# Patient Record
Sex: MALE | Race: OTHER | Employment: UNEMPLOYED | URBAN - METROPOLITAN AREA
[De-identification: names, ages, dates, MRNs, and addresses within clinical notes are randomized per-mention and may not be internally consistent; named-entity substitution may affect disease eponyms.]

---

## 2021-03-31 PROBLEM — M54.2 CERVICALGIA: Status: ACTIVE | Noted: 2021-03-31

## 2021-03-31 PROBLEM — K59.00 CONSTIPATION: Status: ACTIVE | Noted: 2021-03-31

## 2021-03-31 PROBLEM — R26.89 ANTALGIC GAIT: Status: ACTIVE | Noted: 2021-03-31

## 2021-03-31 PROBLEM — R20.2 NUMBNESS AND TINGLING OF BOTH UPPER EXTREMITIES: Status: ACTIVE | Noted: 2021-03-31

## 2021-03-31 PROBLEM — R20.0 NUMBNESS AND TINGLING OF BOTH LEGS: Status: ACTIVE | Noted: 2021-03-31

## 2021-03-31 PROBLEM — M54.50 ACUTE BILATERAL LOW BACK PAIN: Status: ACTIVE | Noted: 2021-03-31

## 2021-03-31 PROBLEM — M54.6 ACUTE BILATERAL THORACIC BACK PAIN: Status: ACTIVE | Noted: 2021-03-31

## 2021-03-31 PROBLEM — T14.90XA INJURY: Status: ACTIVE | Noted: 2021-03-31

## 2021-03-31 PROBLEM — R20.0 NUMBNESS AND TINGLING OF BOTH UPPER EXTREMITIES: Status: ACTIVE | Noted: 2021-03-31

## 2021-03-31 PROBLEM — Z13.228 SCREENING FOR METABOLIC DISORDER: Status: ACTIVE | Noted: 2021-03-31

## 2021-03-31 PROBLEM — R30.0 DYSURIA: Status: ACTIVE | Noted: 2021-03-31

## 2021-03-31 PROBLEM — R20.2 NUMBNESS AND TINGLING OF BOTH LEGS: Status: ACTIVE | Noted: 2021-03-31

## 2021-04-02 ENCOUNTER — HOSPITAL ENCOUNTER (OUTPATIENT)
Dept: MRI IMAGING | Age: 46
Discharge: HOME | End: 2021-04-02
Attending: FAMILY MEDICINE

## 2021-04-02 DIAGNOSIS — T14.90XA INJURY: ICD-10-CM

## 2021-04-02 DIAGNOSIS — R20.0 NUMBNESS AND TINGLING OF BOTH LEGS: ICD-10-CM

## 2021-04-02 DIAGNOSIS — R20.0 NUMBNESS AND TINGLING OF BOTH UPPER EXTREMITIES: ICD-10-CM

## 2021-04-02 DIAGNOSIS — R20.2 NUMBNESS AND TINGLING OF BOTH UPPER EXTREMITIES: ICD-10-CM

## 2021-04-02 DIAGNOSIS — M54.50 ACUTE BILATERAL LOW BACK PAIN, UNSPECIFIED WHETHER SCIATICA PRESENT: ICD-10-CM

## 2021-04-02 DIAGNOSIS — R20.2 NUMBNESS AND TINGLING OF BOTH LEGS: ICD-10-CM

## 2021-04-02 DIAGNOSIS — M54.2 CERVICALGIA: ICD-10-CM

## 2021-04-02 DIAGNOSIS — M54.6 ACUTE BILATERAL THORACIC BACK PAIN: ICD-10-CM

## 2021-04-02 PROCEDURE — 72141 MRI NECK SPINE W/O DYE: CPT

## 2021-04-02 PROCEDURE — 72148 MRI LUMBAR SPINE W/O DYE: CPT

## 2021-04-02 PROCEDURE — 72146 MRI CHEST SPINE W/O DYE: CPT

## 2021-04-30 PROBLEM — Z13.228 SCREENING FOR METABOLIC DISORDER: Status: RESOLVED | Noted: 2021-03-31 | Resolved: 2021-04-30

## 2021-05-14 ENCOUNTER — APPOINTMENT (OUTPATIENT)
Dept: PHYSICAL THERAPY | Age: 46
End: 2021-05-14

## 2021-05-17 ENCOUNTER — APPOINTMENT (OUTPATIENT)
Dept: PHYSICAL THERAPY | Age: 46
End: 2021-05-17

## 2021-05-20 ENCOUNTER — APPOINTMENT (OUTPATIENT)
Dept: PHYSICAL THERAPY | Age: 46
End: 2021-05-20

## 2021-05-21 ENCOUNTER — APPOINTMENT (OUTPATIENT)
Dept: PHYSICAL THERAPY | Age: 46
End: 2021-05-21

## 2021-05-24 ENCOUNTER — APPOINTMENT (OUTPATIENT)
Dept: PHYSICAL THERAPY | Age: 46
End: 2021-05-24

## 2021-05-26 ENCOUNTER — APPOINTMENT (OUTPATIENT)
Dept: PHYSICAL THERAPY | Age: 46
End: 2021-05-26

## 2021-05-28 ENCOUNTER — APPOINTMENT (OUTPATIENT)
Dept: PHYSICAL THERAPY | Age: 46
End: 2021-05-28

## 2021-06-04 ENCOUNTER — HOSPITAL ENCOUNTER (OUTPATIENT)
Dept: PHYSICAL THERAPY | Age: 46
Discharge: HOME | End: 2021-06-04
Attending: ORTHOPAEDIC SURGERY

## 2021-06-04 DIAGNOSIS — M79.604 RIGHT LEG PAIN: ICD-10-CM

## 2021-06-04 DIAGNOSIS — M54.6 THORACIC BACK PAIN, UNSPECIFIED BACK PAIN LATERALITY, UNSPECIFIED CHRONICITY: ICD-10-CM

## 2021-06-04 DIAGNOSIS — M54.2 NECK PAIN: ICD-10-CM

## 2021-06-04 DIAGNOSIS — M54.50 LOW BACK PAIN, UNSPECIFIED BACK PAIN LATERALITY, UNSPECIFIED CHRONICITY, UNSPECIFIED WHETHER SCIATICA PRESENT: ICD-10-CM

## 2021-06-04 DIAGNOSIS — M79.605 LEFT LEG PAIN: ICD-10-CM

## 2021-06-04 PROCEDURE — 97162 PT EVAL MOD COMPLEX 30 MIN: CPT

## 2021-06-04 NOTE — PROGRESS NOTES
Himanshu Romo  : 1975  Payor: /  [id number]  at [facility]  [address], [address] [facility], [address]  [phone]   [phone]      OUTPATIENT PHYSICAL THERAPY: Daily Treatment Note 2021  Visit Count:  1    ICD-10: Treatment Diagnosis:   M54.5 Low Back Pain   M54.6 Pain in thoracic spine  M54.2 Cervicalgia  R26.2 Difficulty in Walking, Not Elsewhere Classified    Precautions/Allergies:   Patient has no known allergies. TREATMENT PLAN:  Effective Dates: 2021 TO 2021 (90 days). Frequency/Duration: 2 times a week for 90 Days        PRE-TREATMENT SYMPTOMS/COMPLAINTS:  Central LBP    MEDICATIONS REVIEWED:  2021   TREATMENT:   (In addition to Assessment/Re-Assessment sessions the following treatments were rendered)    THERAPEUTIC EXERCISE: (0 minutes):  Exercises per grid below to improve mobility, strength and balance. Required minimal visual and verbal cues to promote proper body alignment and promote proper body posture. Progressed resistance, range and complexity of movement as indicated. Date:  2021 Date:   Date:     Activity/Exercise Parameters Parameters Parameters   LTR HEP     Seated HS stretch HEP                                     MANUAL THERAPY: (0 minutes): Joint mobilization, Soft tissue mobilization and Manipulation was utilized and necessary because of the patient's restricted joint motion, painful spasm, loss of articular motion and restricted motion of soft tissue. (Used abbreviations: MET - muscle energy technique; PNF - proprioceptive neuromuscular facilitation; NMR - neuromuscular re-education; AP - anterior to posterior; PA - posterior to anterior)    MODALITIES: (0 minutes):      none      TREATMENT/SESSION ASSESSMENT:  Himanshu Romo verbalized understanding of role of PT and POC.     Education: on objective findings    RECOMMENDATIONS/INTENT FOR NEXT TREATMENT SESSION: \"Next visit will focus on advancements to more challenging activities\".     PAIN: Initial: 7/10 Post Session:  7/10     [employer] Portal    Total Treatment Billable Duration:  PT Patient Time In/Time Out  Time In: 1305  Time Out: Faisal 61, PT, DPT    Future Appointments   Date Time Provider Marianna Aminah   6/15/2021 10:40 AM Anitha Roblero MD [id number] [place]       Visit Approval Visit # Therapist initials Date A NS / Cx < 24 hr >24 hr Cx Comments    1 PAU 6/4/21 [x]  [] [] Initial evaluation (self pay eval only)       [] [] []        [] [] []        [] [] []        [] [] []        [] [] []        [] [] []        [] [] []        [] [] []        [] [] []        [] [] []        [] [] []        [] [] []        [] [] []        [] [] []        [] [] []        [] [] []        [] [] []

## 2021-06-04 NOTE — THERAPY EVALUATION
Robert Chavez : 1975 Payor:   [address]  at [facility] [address], [address] [facility], [address] [phone]   [phone] OUTPATIENT PHYSICAL THERAPY:Initial Assessment 2021 ICD-10: Treatment Diagnosis:  
M54.5 Low Back Pain M54.6 Pain in thoracic spine M54.2 Cervicalgia R26.2 Difficulty in Walking, Not Elsewhere Classified Precautions/Allergies:  
Patient has no known allergies. Fall Risk Score: (? 5 = High Risk) MD Orders: Eval and Treat MEDICAL/REFERRING DIAGNOSIS: 
Thoracic back pain, unspecified back pain laterality, unspecified chronicity [M54.6] Low back pain, unspecified back pain laterality, unspecified chronicity, unspecified whether sciatica present [M54.5] Neck pain [M54.2] Left leg pain [M79.605] Right leg pain [M79.604] DATE OF ONSET: 2021 REFERRING PHYSICIAN: Letitia Bañuelos MD 
RETURN PHYSICIAN APPOINTMENT: TBD by pt ASSESSMENT:  Mr. Ilir Treviño has attended 1 physical therapy session including the initial evaluation. Pt presents with decreased ROM, decreased strength, and increased pain affecting participation in ADLs, functional mobility, and work tasks. Suspect possible malingering due to inconsistencies throughout examination. Recommending skilled PT: manual therapeutic techniques (as appropriate), therapeutic exercises and activities, balance interventions, and a comprehensive home exercise program to address the current impairments, as listed above. Robert Chavez will benefit from skilled PT (medically necessary) to address above deficits affecting participation in basic ADLs and overall functional tolerance. PROBLEM LIST (Impacting functional limitations): 1. Decreased Strength 2. Decreased ADL/Functional Activities 3. Decreased Transfer Abilities 4. Decreased Ambulation Ability/Technique 5. Decreased Balance 6. Increased Pain 7. Decreased Flexibility/Joint Mobility 8.  Decreased [place] with Home Exercise Program INTERVENTIONS PLANNED: 
1. Balance Exercise 2. Cold 3. Cryotherapy 4. Electrical Stimulation 5. Family Education 6. Gait Training 7. Heat 8. Home Exercise Program (HEP) 9. Manual Therapy 10. Neuromuscular Re-education/Strengthening 11. Range of Motion (ROM) 12. Therapeutic Activites 13. Therapeutic Exercise/Strengthening 14. Transcutaneous Electrical Nerve Stimulation (TENS) 15. Transfer Training 16. Vasopneumatic Device 17. Dry Needling TREATMENT PLAN: 
TREATMENT PLAN: 
Effective Dates: 6/4/2021 TO 9/2/2021 (90 days). Frequency/Duration: 2 times a week for 90 Days Short-Term Functional Goals: Time Frame: 4 weeks 1. Pt will be compliant with HEP. 2. Pt will decreased worst pain to 6/10 or less in order to return to lifting for work tasks 3. Pt will decreased DEREK to 30 or less demonstrating overall improvements in functional mobility 4. Pt will improve BLE strength to 4/5 in order to improve standing and walking tolerance to > 20 minutes Discharge Goals: Time Frame: 12 weeks 1. Pt will be independent with HEP. 2. Pt will decreased worst pain to 4/10 or less in order to resume hygiene activities 3. Pt will decreased DEREK to 20 or less demonstrating overall improvements in functional mobility 4. Pt will improve BLE strength to 4+/5 or greater in order to improve standing and walking tolerance to > 30 minutes Rehabilitation Potential For Stated Goals: Guarded Outcome Measure: Tool Used: Lower Extremity Functional Scale (LEFS) Score:  Initial: 24/80 Most Recent: X/80 (Date: -- ) Interpretation of Score: 20 questions each scored on a 5 point scale with 0 representing \"extreme difficulty or unable to perform\" and 4 representing \"no difficulty\". The lower the score, the greater the functional disability. 80/80 represents no disability. Minimal detectable change is 9 points.  
 
 
Tool Used: Modified Oswestry Low Back Pain Questionnaire Score:  Initial: 35/50  Most Recent: X/50 (Date: -- ) Interpretation of Score: Each section is scored on a 0-5 scale, 5 representing the greatest disability. The scores of each section are added together for a total score of 50. Medical Necessity:  
· Skilled intervention continues to be required due to decreased strength, ROM, balance, and functional mobility. Reason for Services/Other Comments: 
· Patient continues to require skilled intervention due to decreased strength, balance, and ROM with increased pain affecting pt functional mobility. ·  
Regarding Justin Romero therapy, I certify that the treatment plan above will be carried out by a therapist or under their direction. Thank you for this referral, Royce Velazquez, PT, DPT Referring Physician Signature: Riana Gómez MD              Date The information in this section was collected on 6/4/21 (except where otherwise noted). HISTORY:  
History of Present Injury/Illness (Reason for Referral): 
Pt arrives with c/o of centralized lower back pain. Pt reports DEEP occurred March 1, 2021. Pt reports lifting a panel with his supervisor over his head and resting it on his head. Pt reports hyperextending at his back. Pt reporting increased difficulty with walking and lifting heavier objects. Pt reports his R LE feels \"sleeping\" at times then stating L one falls asleep as well. Pt reports increased pain with standing and having difficulty sleeping throughout the night. Pt currently out of work. Per referring physician note HPI this is a new visit on Britney Tejada is a 58-year-old  gentleman who is referred by Dr. Karrie Tripp for complaints of cervical thoracic and lumbar pain.   He states he has been hurting for 4 to 6 weeks since a work injury as he was trying to hold up a 20 to 30 pound basket while in a squatting position and he said initially he had pain in the upper back neck but over time its been more prominent lower back and bilateral legs he describes the leg pain is going down the posterior legs to the feet with occasional numbness and tingling in the right and left leg pain about equal but the worst overall pain he has is in the thoracic area and he has not worked since the injury and when I ask about attorneys he currently does not have 1 but his friend had us work injury and told him he needs to get an  so he is good to be looking into that. He says the pain is constant is worse walking is better laying on his side and he is having to use a cane to get around now but when he goes to his medical history he denies any diabetes heart attack or heart or lung problem any major medical problem is not on blood thinners and treatment has been anti-inflammatories with both meloxicam and Naprosyn as well as tramadol and activity restriction. Cervical thoracic lumbar MRI scan dated 4/2/2021. His cervical and thoracic MRIs were totally normal to me and the radiologist basically said totally normal.  His lumbar MRI is relatively unimpressive he does have both some bulging of the disc at L4-5 a little offset to the right is not really a disc herniation but there is some bulging and potentially could cause lateral recess stenosis and some pressure on the right L5 nerve root but that is not his symptoms. Overall patient's exam see to be inconsistent with what we see on the scans he seems to have a lot of pain behavior a lot of pain but not any significant findings I wonder if there is secondary gain issues here.   I recommend that we put him in therapy his he has not had that this would appear to be a strain sprain type issue of the soft tissues put him in therapy give him a Sterapred DS 12-day Dosepak refill meloxicam 7.5 mg prescription 60 tablets 1 twice daily and give him a refill of the tramadol 28 tablets 1 every 6 hours as needed with a refill and see if he gets better with the the therapy and anti-inflammatories and steroids if not we will just have him follow-up with his family doctor again I could not find an area I thought in his legs that would be worth getting an x-ray on since everything is tender and hurts maybe things will be more defined after he finishes therapy this concludes note CC/Primary Concern: Bilateral back pain Treatment Side: Bilateral 
 
 
Past Medical History/Comorbidities:  
Mr. Ирина Calvillo  has no past medical history on file. Mr. Ирина Calvillo  has no past surgical history on file. Pain/Symptom Location: Centralized lower back pain Worst Pain: 8/10 Current Pain: 7/10 Aggravating Factors: Standing and Walking Alleviating Factors/Positions/Motions: Rest, medication Diagnostic Imaging: MRI results as seen below CERVICAL FINDINGS:  Signal from the marrow spaces in the spine is unremarkable. The cerebellar tonsils lie at the level of the foramen magnum. There is 
straightening of the cervical spine with loss of normal lordosis. 
  
At C4-5, C5-6, and C6-7, there are mild posterior disc bulges without 
significant central or foraminal narrowing. 
  
  
THORACIC FINDINGS:  Signal from the thoracic cord and marrow spaces in the 
thoracic spine is unremarkable. 
  
No focal disc protrusion is evident.   There is very mild central and bilateral 
foraminal narrowing at T9-T10 associated with prominent facet spurring. 
  
  
LUMBAR FINDINGS: Signal from the marrow spaces and lumbar spine is unremarkable. 
  
At L1-2, there is broad-based posterior disc protrusion/spur complex without 
significant central or foraminal narrowing. 
  
At L2-3, there is mild central spinal stenosis associated with broad-based 
posterior disc protrusion and a very small central inferior extrusion as well as 
bilateral facet hypertrophy. 
  
At L3-4, there is broad-based posterior disc protrusion with mild bilateral 
foraminal narrowing. 
  
At L4-5, mild central spinal stenosis is associated with broad-based posterior 
disc protrusion which is most marked at the right paracentral/foraminal margin. There is Moderate right and mild left foraminal narrowing as well. 
  
At L5-S1, there is mild diffuse posterior disc bulge as well as moderate right 
and mild left foraminal narrowing. Occupation: Pt works in construction Prior Level of Function/Work/Activity:  
Pt independent with all mobility and painfree but then later reports having some pain at neck and B shoulder prior to injury but then states this is only since injury. Patient Goals: To get rid of the back pain Current Medications:   
  
Current Outpatient Medications:  
  meloxicam (MOBIC) 7.5 mg tablet, Take 1 Tab by mouth two (2) times a day., Disp: 60 Tab, Rfl: 2 
  naproxen (NAPROSYN) 500 mg tablet, Take 1 Tab by mouth two (2) times daily (with meals). For pain, Disp: 20 Tab, Rfl: 0 
  losartan (COZAAR) 25 mg tablet, Take 1 Tab by mouth daily. , Disp: 90 Tab, Rfl: 0 Date Last Reviewed:  6/4/2021 Ambulatory/Rehab Services H2 Model Falls Risk Assessment Risk Factors: 
     (1)  Gender [Male] Ability to Rise from Chair: 
     (0)  Ability to rise in a single movement Falls Prevention Plan: No modifications necessary Total: (5 or greater = High Risk): 1 ©2010 [facility] States Patent #[id number]. Federal Law prohibits the replication, distribution or use without written permission from [facility] of [employer] Number of Personal Factors/Comorbidities that affect the Plan of Care: 1-2: MODERATE COMPLEXITY EXAMINATION:  
Inspection Pelvic alignment: Not assessed this session Additional Comments:  
________________________________________________________________________________________________ Observation Observed pt ambulating into clinic from parking lot without use of SPC and normal gait pattern, lauro and equal arm swing.  Pt then returned to vehicle and back to clinic with use of SPC and antalgic gait pattern and holding R UE to lower back continued this gait pattern into treatment room for evaluation Posture: LE Structure: 
      Right: slight ER Left: unremarkable Gait: Antalgic and Decreased Step Length Assistive Device: [facility] Edema: None noted Addition Comments:  
 
________________________________________________________________________________________________ Range of Motion Lumbar Joint:    
 Right (Degrees/Percent) Left (Degrees/Percent) Flexion 15 degrees, pain Extension 5 degrees, pain Side Bending 10 degrees, pain 10 degrees, pain Rotation Lower Joint: Passive Passive Active Active Right (Degrees) Left (Degrees) Right (Degrees) Left (Degrees) Hip Flexion   Decreased Decreased Hip Extension Hip Abduction Hip Internal Rotation    Decreased Decreased Hip External Rotation   Decreased Decreased Additional Comments:  
________________________________________________________________________________________________ Strength Lower Extremity Joint:    
 RIGHT LEFT Hip Flexion 3+/5 4+-5/5 Hip Extension NT/5 NT/5 Hip Internal Rotation 3/5 (stops resisting) 4+/5 Hip External Rotation 3/5 (stops resisting) 3/5 Hip Abduction NT/5 NT/5 Knee flexion 3/5 (stops resisting) 5/5 Knee extension 3/5 (stops resisting) 5/5   
 
________________________________________________________________________________________________ Neruo-Vascular C/O Radicular Symptoms: Yes Additional Comments: Describes as numbness (sleeping) 
________________________________________________________________________________________________ 
       
________________________________________________________________________________________________ Palpation: B lumbar paraspinals (pt reports \"itching\" sensation) pt sensitive to both deep pressure and light touch (eliciting very exacceragted pain response with both) Joint mobilization: Not assessed this session due to pain level Body Structures Involved: 1. Nerves 2. Bones 3. Joints 4. Muscles 5. Ligaments Body Functions Affected: 1. Sensory/Pain 2. Neuromusculoskeletal 
3. Movement Related Activities and Participation Affected: 1. General Tasks and Demands 2. Mobility 3. Self Care 4. Domestic Life 5. Interpersonal Interactions and Relationships 6. Community, Social and [place] Number of elements (examined above) that affect the Plan of Care: 4+: HIGH COMPLEXITY CLINICAL PRESENTATION:  
Presentation: Evolving clinical presentation with unstable and unpredictable characteristics: HIGH COMPLEXITY CLINICAL DECISION MAKING:  
  
Use of outcome tool(s) and clinical judgement create a POC that gives a: Questionable prediction of patient's progress: MODERATE COMPLEXITY

## 2021-06-11 ENCOUNTER — HOSPITAL ENCOUNTER (OUTPATIENT)
Dept: PHYSICAL THERAPY | Age: 46
Discharge: HOME | End: 2021-06-11
Attending: ORTHOPAEDIC SURGERY

## 2021-06-11 PROCEDURE — 97110 THERAPEUTIC EXERCISES: CPT

## 2021-06-11 PROCEDURE — 97140 MANUAL THERAPY 1/> REGIONS: CPT

## 2021-06-11 NOTE — PROGRESS NOTES
Rosalba Giordano  : 1975  Payor: /  [id number]  at [facility]  [address], [address], [place], [address] [facility]  [phone]   [phone]      OUTPATIENT PHYSICAL THERAPY: Daily Treatment Note 2021  Visit Count:  2    ICD-10: Treatment Diagnosis:   M54.5 Low Back Pain   M54.6 Pain in thoracic spine  M54.2 Cervicalgia  R26.2 Difficulty in Walking, Not Elsewhere Classified    Precautions/Allergies:   Patient has no known allergies. TREATMENT PLAN:  Effective Dates: 2021 TO 2021 (90 days). Frequency/Duration: 2 times a week for 90 Days        PRE-TREATMENT SYMPTOMS/COMPLAINTS:  Pt arrives today without SPC today. Pt reports back pain is the same today and hasn't changed. Pt reports he has tried ice but didn't find any relief. MEDICATIONS REVIEWED:  2021   TREATMENT:   (In addition to Assessment/Re-Assessment sessions the following treatments were rendered)    THERAPEUTIC EXERCISE: (15 minutes):  Exercises per grid below to improve mobility, strength and balance. Required minimal visual and verbal cues to promote proper body alignment and promote proper body posture. Progressed resistance, range and complexity of movement as indicated.      Date:  2021 Date:  21 Date:     Activity/Exercise Parameters Parameters Parameters   LTR HEP 10 X each    Bridges  10 X    ASLR  10 X each    clamshell  10 X each    S/l hip abduction  10 X each    STS   5 X slow eccentric movement      MANUAL THERAPY: (25 minutes): Joint mobilization, Soft tissue mobilization and Manipulation was utilized and necessary because of the patient's restricted joint motion, painful spasm, loss of articular motion and restricted motion of soft tissue.   + manual stretching B HS, glutes, piriformis   + STM B lumbar paraspinals    (Used abbreviations: MET - muscle energy technique; PNF - proprioceptive neuromuscular facilitation; NMR - neuromuscular re-education; AP - anterior to posterior; PA - posterior to anterior)    MODALITIES: (0 minutes):      none      TREATMENT/SESSION ASSESSMENT:  Again arrives into clinic without antalgic gait and waiting room however when taken from waiting room support R side of LB with R hand. Continues with very exaggerated response inconsistently to light touch and deep pressure. Pt continues to demonstrate inconsistencies (ie. Lifts LE onto mat without difficulty but later presents with inability to bend and knee and hip, able to bend knee for clamshells without difficulty) Pt demonstrating poor eccentric control during SLR at beginning of repetitions but good control towards end of repetitions. Pt able to perform STS with very slow controlled movement when PT requests. Observed pt ambulating out of clinic with support to R side of back and then waving to another car with R hand and walked remaining way to car without support to lower back region and normal gait pattern. Education: on objective findings    RECOMMENDATIONS/INTENT FOR NEXT TREATMENT SESSION: \"Next visit will focus on advancements to more challenging activities\".     PAIN: Initial: 8/10 Post Session: 9 /10     [employer] Portal    Total Treatment Billable Duration:  PT Patient Time In/Time Out  Time In: 1015  Time Out: 1055  Helga Esparza, PT, DPT    Future Appointments   Date Time Provider Marianna Burr   6/14/2021  8:45 AM Polly OROURKE [facility]   6/15/2021 10:40 AM Roverto Sykes MD [id number] [place]   6/16/2021  8:45 AM Polly OROURKE [facility]       Visit Approval Visit # Therapist initials Date A NS / Cx < 24 hr >24 hr Cx Comments    1 PAU 6/4/21 [x]  [] [] Initial evaluation (self pay eval only)     PAU 6/9/21 [] [x] [] Called and left voicemail regarding today's missed appointment    2 [facility] 6/11/21 [x] [] []        [] [] []        [] [] []        [] [] []        [] [] []        [] [] []        [] [] []        [] [] []        [] [] []        [] [] []        [] [] []        [] [] []        [] [] []        [] [] []        [] [] []        [] [] []

## 2021-06-16 ENCOUNTER — APPOINTMENT (OUTPATIENT)
Dept: PHYSICAL THERAPY | Age: 46
End: 2021-06-16
Attending: ORTHOPAEDIC SURGERY

## 2021-06-25 ENCOUNTER — HOSPITAL ENCOUNTER (OUTPATIENT)
Dept: PHYSICAL THERAPY | Age: 46
Discharge: HOME | End: 2021-06-25
Attending: ORTHOPAEDIC SURGERY

## 2021-06-25 PROCEDURE — 97110 THERAPEUTIC EXERCISES: CPT

## 2021-06-25 PROCEDURE — 97140 MANUAL THERAPY 1/> REGIONS: CPT

## 2021-06-25 NOTE — PROGRESS NOTES
Michael Fisher  : 1975  Payor: /  [id number]  at [facility]  [address], [address], [place], [facility]  [phone]   [phone]        OUTPATIENT PHYSICAL THERAPY: Daily Treatment Note 2021  Visit Count:  3    ICD-10: Treatment Diagnosis:   M54.5 Low Back Pain   M54.6 Pain in thoracic spine  M54.2 Cervicalgia  R26.2 Difficulty in Walking, Not Elsewhere Classified    Precautions/Allergies:   Patient has no known allergies. TREATMENT PLAN:  Effective Dates: 2021 TO 2021 (90 days). Frequency/Duration: 2 times a week for 90 Days        PRE-TREATMENT SYMPTOMS/COMPLAINTS: Pt reports his back has been feeling about the same. \"I'm not able to jump or run\". Pt reports increased pain at center of back when driving to PT today. MEDICATIONS REVIEWED:  2021   TREATMENT:   (In addition to Assessment/Re-Assessment sessions the following treatments were rendered)    THERAPEUTIC EXERCISE: (30 minutes):  Exercises per grid below to improve mobility, strength and balance. Required minimal visual and verbal cues to promote proper body alignment and promote proper body posture. Progressed resistance, range and complexity of movement as indicated.      Date:  2021 Date:  21 Date:  21   Activity/Exercise Parameters Parameters Parameters   LTR HEP 10 X each 10 X each   Bridges  10 X 10 X    ASLR  10 X each 10 X each   clamshell  10 X each 10 X each   S/l hip abduction  10 X each 10 X each   STS   5 X slow eccentric movement 5X w/ slow eccentric movement     MANUAL THERAPY: (10 minutes): Joint mobilization, Soft tissue mobilization and Manipulation was utilized and necessary because of the patient's restricted joint motion, painful spasm, loss of articular motion and restricted motion of soft tissue.   + manual stretching B HS, glutes, piriformis   +STM B lumbar paraspinals      (Used abbreviations: MET - muscle energy technique; PNF - proprioceptive neuromuscular facilitation; NMR - neuromuscular re-education; AP - anterior to posterior; PA - posterior to anterior)    MODALITIES: (0 minutes):      none      TREATMENT/SESSION ASSESSMENT: Again pt noted walking into clinic with normalized gait pattern and then began holding to R side of lower back when in the office. Improved mobility noted with supine<>sitting and rolling on mat with transitioning to perform exercises. Pt performs exercises today with good slow controlled movement and additional time but with increased respiratory rate and grunting. Pt again able to perform STS with very slow controlled movement again with grunting during each rep. Education: on objective findings    RECOMMENDATIONS/INTENT FOR NEXT TREATMENT SESSION: \"Next visit will focus on advancements to more challenging activities\". PAIN: Initial: 7/10 Post Session:  8/10     [employer] Portal    Total Treatment Billable Duration:  PT Patient Time In/Time Out  Time In: 0805  Time Out: 0845  Traci Grigsby, PT, DPT    Future Appointments   Date Time Provider Marianna Burr   6/28/2021  9:00 AM Mine Holman MD [id number]   7/2/2021  8:00 AM Kenia OROURKE [facility]       Visit Approval Visit # Therapist initials Date A NS / Cx < 24 hr >24 hr Cx Comments    1 PAU 6/4/21 [x]  [] [] Initial evaluation (self pay eval only)     PAU 6/9/21 [] [x] [] Called and left voicemail regarding today's missed appointment    2 PAU 6/11/21 [x] [] []      PAU 6/14/21 [] [x] [] Called and left voicemail regarding today's missed appointment. Reminded pt of discharge if NS/same day cancel for any remaining June appointments per clinic policy.     3 PAU 6/25/21 [x] [] []        [] [] []        [] [] []        [] [] []        [] [] []        [] [] []        [] [] []        [] [] []        [] [] []        [] [] []        [] [] []        [] [] []        [] [] []        [] [] []

## 2021-06-28 PROBLEM — M54.6 THORACIC BACK PAIN: Status: ACTIVE | Noted: 2021-06-28

## 2021-06-28 PROBLEM — I10 ESSENTIAL HYPERTENSION: Status: ACTIVE | Noted: 2021-06-28

## 2021-06-28 PROBLEM — M54.50 LOW BACK PAIN: Status: ACTIVE | Noted: 2021-06-28

## 2021-06-28 PROBLEM — M53.9 MULTILEVEL DEGENERATIVE DISC DISEASE: Status: ACTIVE | Noted: 2021-06-28

## 2021-08-30 PROBLEM — M47.816 FACET ARTHROPATHY, LUMBAR: Status: ACTIVE | Noted: 2021-08-30

## 2021-12-07 PROBLEM — R05.9 COUGH: Status: ACTIVE | Noted: 2021-12-07

## 2021-12-07 PROBLEM — R10.826 EPIGASTRIC ABDOMINAL TENDERNESS WITH REBOUND TENDERNESS: Status: ACTIVE | Noted: 2021-12-07

## 2021-12-07 PROBLEM — G89.4 CHRONIC PAIN SYNDROME: Status: ACTIVE | Noted: 2021-12-07

## 2021-12-07 PROBLEM — J32.9 SINUSITIS: Status: ACTIVE | Noted: 2021-12-07

## 2021-12-07 PROBLEM — R10.10 UPPER ABDOMINAL PAIN: Status: ACTIVE | Noted: 2021-12-07

## 2021-12-07 PROBLEM — Z23 ENCOUNTER FOR IMMUNIZATION: Status: ACTIVE | Noted: 2021-12-07

## 2021-12-07 PROBLEM — R07.9 CHEST PAIN: Status: ACTIVE | Noted: 2021-12-07

## 2021-12-08 ENCOUNTER — HOSPITAL ENCOUNTER (OUTPATIENT)
Dept: CT IMAGING | Age: 46
Discharge: HOME | End: 2021-12-08
Attending: FAMILY MEDICINE

## 2021-12-08 DIAGNOSIS — R10.826 EPIGASTRIC ABDOMINAL TENDERNESS WITH REBOUND TENDERNESS: ICD-10-CM

## 2021-12-08 DIAGNOSIS — R10.10 UPPER ABDOMINAL PAIN: ICD-10-CM

## 2021-12-08 DIAGNOSIS — R07.9 CHEST PAIN, UNSPECIFIED TYPE: ICD-10-CM

## 2021-12-08 PROCEDURE — 74011000636 HC RX REV CODE- 636: Performed by: FAMILY MEDICINE

## 2021-12-08 PROCEDURE — 74011000258 HC RX REV CODE- 258: Performed by: FAMILY MEDICINE

## 2021-12-08 PROCEDURE — 74177 CT ABD & PELVIS W/CONTRAST: CPT

## 2021-12-08 RX ORDER — SODIUM CHLORIDE 0.9 % (FLUSH) 0.9 %
10 SYRINGE (ML) INJECTION
Status: ACTIVE | OUTPATIENT
Start: 2021-12-08 | End: 2021-12-08

## 2021-12-08 RX ADMIN — IOPAMIDOL 100 ML: 755 INJECTION, SOLUTION INTRAVENOUS at 12:30

## 2021-12-08 RX ADMIN — SODIUM CHLORIDE 100 ML: 9 INJECTION, SOLUTION INTRAVENOUS at 12:29

## 2021-12-08 RX ADMIN — DIATRIZOATE MEGLUMINE AND DIATRIZOATE SODIUM 15 ML: 660; 100 LIQUID ORAL; RECTAL at 12:30

## 2022-01-04 PROBLEM — L27.0 DRUG RASH: Status: ACTIVE | Noted: 2022-01-04

## 2022-01-06 PROBLEM — Z23 ENCOUNTER FOR IMMUNIZATION: Status: RESOLVED | Noted: 2021-12-07 | Resolved: 2022-01-06

## 2022-01-12 PROBLEM — E66.9 OBESITY (BMI 30-39.9): Status: ACTIVE | Noted: 2022-01-12

## 2022-01-12 PROBLEM — R07.89 ATYPICAL CHEST PAIN: Status: ACTIVE | Noted: 2021-12-07

## 2022-02-11 ENCOUNTER — HOSPITAL ENCOUNTER (OUTPATIENT)
Dept: GENERAL RADIOLOGY | Age: 47
Discharge: HOME | End: 2022-02-11

## 2022-02-11 ENCOUNTER — TRANSCRIBE ORDER (OUTPATIENT)
Dept: REGISTRATION | Age: 47
End: 2022-02-11

## 2022-02-11 DIAGNOSIS — M25.551 RIGHT HIP PAIN: ICD-10-CM

## 2022-02-11 DIAGNOSIS — M25.551 RIGHT HIP PAIN: Primary | ICD-10-CM

## 2022-02-11 PROCEDURE — 73502 X-RAY EXAM HIP UNI 2-3 VIEWS: CPT

## 2022-03-18 PROBLEM — K59.00 CONSTIPATION: Status: ACTIVE | Noted: 2021-03-31

## 2022-03-18 PROBLEM — G89.4 CHRONIC PAIN SYNDROME: Status: ACTIVE | Noted: 2021-12-07

## 2022-03-18 PROBLEM — R20.0 NUMBNESS AND TINGLING OF BOTH UPPER EXTREMITIES: Status: ACTIVE | Noted: 2021-03-31

## 2022-03-18 PROBLEM — R07.89 ATYPICAL CHEST PAIN: Status: ACTIVE | Noted: 2021-12-07

## 2022-03-18 PROBLEM — R20.2 NUMBNESS AND TINGLING OF BOTH UPPER EXTREMITIES: Status: ACTIVE | Noted: 2021-03-31

## 2022-03-18 PROBLEM — M54.50 LOW BACK PAIN: Status: ACTIVE | Noted: 2021-06-28

## 2022-03-18 PROBLEM — J32.9 SINUSITIS: Status: ACTIVE | Noted: 2021-12-07

## 2022-03-18 PROBLEM — R10.826 EPIGASTRIC ABDOMINAL TENDERNESS WITH REBOUND TENDERNESS: Status: ACTIVE | Noted: 2021-12-07

## 2022-03-18 PROBLEM — L27.0 DRUG RASH: Status: ACTIVE | Noted: 2022-01-04

## 2022-03-18 PROBLEM — R10.10 UPPER ABDOMINAL PAIN: Status: ACTIVE | Noted: 2021-12-07

## 2022-03-19 PROBLEM — I10 HYPERTENSION: Status: ACTIVE | Noted: 2021-06-28

## 2022-03-19 PROBLEM — R20.2 NUMBNESS AND TINGLING OF BOTH LEGS: Status: ACTIVE | Noted: 2021-03-31

## 2022-03-19 PROBLEM — M54.2 CERVICALGIA: Status: ACTIVE | Noted: 2021-03-31

## 2022-03-19 PROBLEM — M54.6 THORACIC BACK PAIN: Status: ACTIVE | Noted: 2021-06-28

## 2022-03-19 PROBLEM — M54.50 ACUTE BILATERAL LOW BACK PAIN: Status: ACTIVE | Noted: 2021-03-31

## 2022-03-19 PROBLEM — R30.0 DYSURIA: Status: ACTIVE | Noted: 2021-03-31

## 2022-03-19 PROBLEM — R26.89 ANTALGIC GAIT: Status: ACTIVE | Noted: 2021-03-31

## 2022-03-19 PROBLEM — M47.816 FACET ARTHROPATHY, LUMBAR: Status: ACTIVE | Noted: 2021-08-30

## 2022-03-19 PROBLEM — E66.9 OBESITY (BMI 30-39.9): Status: ACTIVE | Noted: 2022-01-12

## 2022-03-19 PROBLEM — R20.0 NUMBNESS AND TINGLING OF BOTH LEGS: Status: ACTIVE | Noted: 2021-03-31

## 2022-03-20 PROBLEM — R05.9 COUGH: Status: ACTIVE | Noted: 2021-12-07

## 2022-03-20 PROBLEM — M54.6 ACUTE BILATERAL THORACIC BACK PAIN: Status: ACTIVE | Noted: 2021-03-31

## 2022-03-20 PROBLEM — M53.9 MULTILEVEL DEGENERATIVE DISC DISEASE: Status: ACTIVE | Noted: 2021-06-28

## 2022-03-20 PROBLEM — T14.90XA INJURY: Status: ACTIVE | Noted: 2021-03-31

## 2022-06-01 ENCOUNTER — OFFICE VISIT (OUTPATIENT)
Dept: ORTHOPEDIC SURGERY | Age: 47
End: 2022-06-01

## 2022-06-01 DIAGNOSIS — M48.062 SPINAL STENOSIS OF LUMBAR REGION WITH NEUROGENIC CLAUDICATION: Primary | ICD-10-CM

## 2022-06-01 PROCEDURE — 99213 OFFICE O/P EST LOW 20 MIN: CPT | Performed by: ORTHOPAEDIC SURGERY

## 2022-06-01 RX ORDER — GABAPENTIN 300 MG/1
CAPSULE ORAL
COMMUNITY
Start: 2022-04-14

## 2022-06-01 RX ORDER — OMEPRAZOLE 20 MG/1
CAPSULE, DELAYED RELEASE ORAL
COMMUNITY
Start: 2022-05-12

## 2022-06-01 NOTE — PROGRESS NOTES
Name: Aaron Ji  YOB: 1975  Gender: male  MRN: [id number]    DATE: 6/1/2022    CC: Follow-up (HAS NEW SCAN)       HPI this is a recheck on patient Aaron Ji who is a 61-year-old  gentleman who had an injury on the job but this is not a Workmen's Compensation case. His injury was March 2021 and his initial MRI scan was not very significant. Mainly showed facet arthropathy at L3-4 and L4-5 but not a lot of stenosis. We had sent him on to pain management at [facility] and he is now being referred back having had a new MRI scan. The MRI scan is from 4/28/2022 and on the scan the patient definitely has some spinal stenosis at L3-4 and L4-5 which is described as moderate. The radiologist also said mild stenosis at L5-S1 but I do not think there is any stenosis there. PE: No change    CURRENT MEDS:     Current Outpatient Medications:     omeprazole (PRILOSEC) 20 MG delayed release capsule, , Disp: , Rfl:     gabapentin (NEURONTIN) 300 MG capsule, TAKE 1 CAPSULE BY MOUTH TWICE DAILY FOR 7 DAYS THEN TAKE 1 CAPSULE BY MOUTH THREE TIMES DAILY, Disp: , Rfl:     amoxicillin (AMOXIL) 875 MG tablet, Take 875 mg by mouth 2 times daily, Disp: , Rfl:     losartan (COZAAR) 25 MG tablet, Take 25 mg by mouth daily, Disp: , Rfl:     naproxen (NAPROSYN) 500 MG tablet, Take 500 mg by mouth 2 times daily (with meals), Disp: , Rfl:     pantoprazole (PROTONIX) 40 MG tablet, Take 40 mg by mouth daily, Disp: , Rfl:     tiZANidine (ZANAFLEX) 4 MG tablet, Take 45 mg by mouth 3 times daily as needed, Disp: , Rfl:     traMADol (ULTRAM) 50 MG tablet, Take 50 mg by mouth as needed. , Disp: , Rfl:        ASSESSMENT/PLAN: I discussed with the patient his current situation and through an  the patient says he is doing very well. I am assuming that he either facet injections or radiofrequency ablation at pain management. He is not in need of surgery.   He would like to return back to work regular duty so we will give him a note to that effect. He will do activity as tolerated and I will see him as needed if his pain returns. Mateo Knox was seen today for follow-up. Diagnoses and all orders for this visit:    Spinal stenosis of lumbar region with neurogenic claudication         [unfilled]     No follow-up provider specified.      Electronically signed by Saniya Daugherty MD

## 2022-06-01 NOTE — LETTER
[address] [address]  Phone: [phone]  Fax: [phone]    Zion Alvarez III, MD        June 1, 2022     Patient: Michael Marking   YOB: 1975   Date of Visit: 6/1/2022       To Whom It May Concern: It is my medical opinion that Tiney Marking may return to full duty on 6/02/2022  with no restrictions . If you have any questions or concerns, please don't hesitate to call.     Sincerely,        Richie Solares MD

## 2022-06-01 NOTE — LETTER
Bebeto Grady  1975  48 y.o.  [id number]    Diagnosis Code:                              RT                  LT                  BILAT    DDDZ   LBP   L/S NEURITIS   L SPRAIN   SPONDY   L STENOSIS   L DISC   POST OLSEN   CRONIC PAIN    SCOLI   LIMB PAIN   TH PAIN   SI JOINT   C DDDZ   C STENOSIS   C DISC   BRACH NEUR   NECK PAIN    CTS   COCCYX   OSTEO   COMP FX   HIP BURS   POST FUS   POST NON   SH PAIN   ARM PAIN    OTHER____________________________________________________________      Radiographic Studies:    CERV/ THORACIC/ LUMBAR MRI       WITH/  W/O  Contrast              _____________________Discogram    CT /Myelogram of _______________                  NCS/EMG  Upper / Lower Extremity________________    MRI of _______________________                     Other______________________    Injections:     ________________________ESI/ SNRB/ FACET                     _______________________Rhizotomies     Authorization to stop blood thinners: _____________________________________________________      Medications:    __________________Sterpred DS                                    ___________________Gabapentin QD/ BID/ TID    __________________Norco/Tramadol   QD / BID / TID    ____________________NSAIDS  QD / BID / TID    __________________Flexeril QD/ BID/ TID                           ____________________Other      Visit Charges:    Recheck 2           Recheck 3               Recheck 4              Recheck 5      INJ ______________________    New PT 2            New PT 3                New PT 4               New PT 5          Pre-op / Post-op      Physical Therapy:    Lumbar  /  Cervical                     ___________________________ (Traction / Ultrasound, Dry Needling)       Referral: Ingrma Given /  [facility]   /OTHER: __________________________________________      Follow-up with SEL______________________________________________________________      Work Note: _____________________________________________________________________